# Patient Record
(demographics unavailable — no encounter records)

---

## 2017-05-21 NOTE — C.PDOC
History Of Present Illness





<Nancy Guillen - Last Filed: 17 13:16>





<lGo Kapoor - Last Filed: 17 13:55>


66-year-old female, PMHx includes ESRD, last HD , presents to the emergency 

department with complaints of respiratory distress. As per EMS, patient was 

found to be in respiratory distress w/ rales. Patient s/p CPAP, NG paste and NG 

sprays, now appears much better. Patient states she feels better. Son states 

"normal amount was removed" during last dialysis. Patient has intermittent 

shortness of breath, "only in the morning." Patient saw PMD for same. Started 

ZPAK x1 dose yesterday, and increased dose of Isosorbide. This morning w/ 

shortness of breath that was worse than before. No fever.








LIMITED DUE TO CLIN COND





AS PER EMS, PT FOUND RESP DISTRESS, +RALES. S/P CPAP, NG PASTE AND NG SPRAYS 

NOW APPEARS MUCH BETTER. PS FEELS BETTER. HO ESRD, LAST HD , SON STATES 

"NORMAL AMOUNT WAS REMOVED". HAS BEEN W INTERMIT SOB "ONLY IN MORNING", SAW PMD 

FOR SAME. STARTED ZPAK X 1 DOSE YEST, INCR DOSE ISOSORBIDE. THIS MORNING W 

WORSE THAN BEFORE SOB. NO FEVER.





ROS LIMITED





EXAM


MOD DIST NONTOXIC BIPAP IN PROGRESS


LUNGS B/L RALES W TACHYPNEA, UNABLE TO SPEAK DUE OT SOB.


CV RRR


WARM DRY


NEURO AO3, APPROPRIATE, INTERACTIVE. (Nancy Guillen)


History Per: EMS, Family


History/Exam Limitations: clinical condition


Current Symptoms Are (Timing): Still Present





<Nancy Guillen - Last Filed: 17 13:16>





<Glo Kapoor - Last Filed: 17 13:55>


Chief Complaint (Nursing): Respiratory Distress





Past Medical History


Reviewed: Historical Data, Nursing Documentation, Vital Signs





- Medical History


PMH: HTN, Hypercholesterolemia, End Stage Renal Disease


Family History: States: Unknown Family Hx





- Social History


Hx Tobacco Use: No


Hx Alcohol Use: No


Hx Substance Use: No





- Immunization History


Hx Tetanus Toxoid Vaccination: Yes


Hx Influenza Vaccination: Yes


Hx Pneumococcal Vaccination: Yes





<Nancy Guillen - Last Filed: 17 13:16>





Review Of Systems


Review Of Systems: ROS cannot be obtained secondary to pt's inabilty to answer 

questions. (respiratory distress)





<Nancy Guillen - Last Filed: 17 13:16>





Physical Exam





- Physical Exam


Appears: Non-toxic, Other (MOD DIST:  BIPAP IN PROGRESS)


Skin: Warm, Dry, No Rash


Head: Atraumatic, Normacephalic


Eye(s): bilateral: Normal Inspection


Nose: Normal


Oral Mucosa: Moist


Lips: Normal Appearing


Neck: Normal ROM


Cardiovascular: Rhythm Regular, No Murmur


Respiratory: Rales ((+) tachypnea), Other (unable to speak due to shortness of 

breath)


Extremity: Normal ROM


Neurological/Psych: Oriented x3 (appropriate, interactive)





<Nancy Guillen - Last Filed: 17 13:16>





ED Course And Treatment





- Laboratory Results


Result Diagrams: 


 17 11:10





 17 11:10


ECG: Interpreted By Me


ECG Rhythm: Sinus Rhythm


ECG Interpretation: Normal, No Acute Changes


Rate From EC


O2 Sat by Pulse Oximetry: 100


Pulse Ox Interpretation: Normal





- Radiology


CXR: Interpreted by Me


CXR Interpretation: Yes: Other (CHF)





<Nancy Guillen - Last Filed: 17 13:16>





- Laboratory Results


Result Diagrams: 


 17 11:10





 17 11:10





<Glo Kapoor - Last Filed: 17 13:55>





Progress





- Data Reviewed


Data Reviewed: Lab, Diagnostic imaging, EKG, Old records





- Critical Care


Citical Care: Excluding Proc Time


Critical Care Time: 120 minutes





- Continuity of Care


Discussed patient case with:: Patient, Family-HIPPA compliant, PMD





<aNncy Guillen - Last Filed: 17 13:16>





<Glo Kapoor - Last Filed: 17 13:55>





- Re-Evaluation


Re-evaluation Note: 





17 12:26


PT MUCH IMPROVED. TOLERATING OFF TRIDAL DRIP





D/W DR ROMEL CANO , WILL ARRANGE FOR EMERGENT HD (Nancy Guillen)





Disposition


Counseled Patient/Family Regarding: Studies Performed, Diagnosis





- Disposition


Disposition Time: 12:28





- POA


Present On Arrival: None





<Nancy Guillen - Last Filed: 17 13:16>





<Glo Kapoor - Last Filed: 17 13:55>





- Disposition


Disposition: HOSPITALIZED


Condition: STABLE





- Clinical Impression


Clinical Impression: 


 CHF (congestive heart failure), ESRD (end stage renal disease) on dialysis, 

Respiratory distress








- Scribe Statement


The provider has reviewed the documentation as recorded by the Scribe





<Nancy Guillen - Last Filed: 17 13:16>





<Glo Kapoor - Last Filed: 17 13:55>





- Scribe Statement





Genna Brown





All medical record entries made by the Scribe were at my direction and 

personally dictated by me. I have reviewed the chart and agree that the record 

accurately reflects my personal performance of the history, physical exam, 

medical decision making, and the department course for this patient. I have 

also personally directed, reviewed, and agree with the discharge instructions 

and disposition. (Nancy Guillen)





Decision To Admit





- Pt Status Changed To:


Hospital Disposition Of: Observation





- .


Bed Request Type: Telemetry


Admitting Physician: Rosina Cano





<Nancy Guillen - Last Filed: 17 13:16>





<Glo Kapoor - Last Filed: 17 13:55>





- .


Patient Diagnosis: 


 CHF (congestive heart failure), ESRD (end stage renal disease) on dialysis, 

Respiratory distress








Addendum





<Nancy Guillen - Last Filed: 17 13:16>





<Glo Kapoor - Last Filed: 17 13:55>


Addendum: 





17 13:54


CXR was placed to PA review folder.











Accession No. : [id number]


Patient Name / ID : LATISHA KAUFFMAN  / [id number]


Exam Date : 2017 11:05:26 ( Approved )


Study Comment : 


Sex / Age : F  / 066Y





Creator : Geoffrey Cates MD


Dictator : Geoffrey Cates MD


 : 


Approver : Geoffrey Cates MD


Approver2 : 





Report Date : 2017 12:10:06


My Comment : 


********************************************************************************

***





PROCEDURE:  CHEST RADIOGRAPH, 1 VIEW





HISTORY:


Shortness of breath 





COMPARISON:


08/10/2016





FINDINGS:





LUNGS:


Moderate to severe venous congestion with prominent bibasilar airspace 

opacities and small to moderate loculated right pleural effusion.





PLEURA:


As above.





CARDIOVASCULAR:


Cardiomegaly.





OSSEOUS STRUCTURES:


No significant abnormalities.





VISUALIZED UPPER ABDOMEN:


Normal.





OTHER FINDINGS:


Question external tubing projecting over the distal left clavicle. 





IMPRESSION:


Moderate to severe venous congestion with prominent bibasilar airspace 

opacities and small to moderate loculated right pleural effusion.











Patient was admitted to the hospital. No actions required from ED at this point.

 (Glo Kapoor)

## 2017-05-21 NOTE — CP.PCM.HP
History of Present Illness





- History of Present Illness


History of Present Illness: 


66-year-old female patient with a past medical history of hypertension, 

hypercholesterolemia, end-stage renal disease, last HD on 5/19, who presented 

to the ED with complaint of respiratory status.  As per EMS, patient was found 

to be in respiratory distress with rales.  Patient S/P CPAP, NG placed and NG 

sprays, now appear much better.  Patient states she feels better.  Son states 

normal amount was removed during last dialysis.  Patient has intermittent 

shortness of breath, "only in the morning".  Patient saw PMD for same, started 

ZPAK x1 dose yesterday, and increased dose of isosorbide.  This morning with 

shortness of breath that was worse than before.  Denies fever








Present on Admission





- Present on Admission


Any Indicators Present on Admission: No





Past Patient History





- Infectious Disease


Hx of Infectious Diseases: None





- Past Medical History & Family History


Past Medical History?: Yes





- Past Social History


Smoking Status: Never Smoked





- CARDIAC


Hx Hypercholesterolemia: Yes


Hx Hypertension: Yes





- RENAL


Type of Dialysis Access: left AV shunt


Date of Last Dialysis Treatment: 05/19/17


Hx Renal Failure: Yes (ESRD on HD)





- HEMATOLOGICAL/ONCOLOGICAL


Hx Blood Transfusions: Yes





- MUSCULOSKELETAL/RHEUMATOLOGICAL


Hx Falls: Yes (unstable)





- GENITOURINARY/GYNECOLOGICAL


Other/Comment: hd





- PSYCHIATRIC


Hx Substance Use: No





- SURGICAL HISTORY


Hx Surgeries: Yes


Hx Kidney Transplant: Yes


Other/Comment: KIDNEY TRANSPLANT.  LEFT ARM AV SHUNT





- ANESTHESIA


Hx Anesthesia: Yes


Hx Anesthesia Reactions: No


Hx Malignant Hyperthermia: No





Meds


Home Medications: 


 Home Medication List











 Medication  Instructions  Recorded  Confirmed  Type


 


Furosemide [Lasix] 80 mg PO DAILY #30 tab 05/23/17  Rx


 


hydrALAZINE [Apresoline] 25 mg PO Q8 #90 tab 05/23/17  Rx











Allergies/Adverse Reactions: 


 Allergies











Allergy/AdvReac Type Severity Reaction Status Date / Time


 


Penicillins Allergy Severe RASH Verified 08/10/16 01:11














Physical Exam





- Constitutional


Appears: Well





- Head Exam


Head Exam: ATRAUMATIC, NORMAL INSPECTION, NORMOCEPHALIC





- Eye Exam


Eye Exam: EOMI, Normal appearance, PERRL


Pupil Exam: NORMAL ACCOMODATION, PERRL





- ENT Exam


ENT Exam: Mucous Membranes Moist, Normal Exam





- Neck Exam


Neck exam: Positive for: Normal Inspection





- Respiratory Exam


Respiratory Exam: Decreased Breath Sounds





- Cardiovascular Exam


Cardiovascular Exam: REGULAR RHYTHM, +S1, +S2





- GI/Abdominal Exam


GI & Abdominal Exam: Diminished Bowel Sounds, Soft





- Rectal Exam


Rectal Exam: Deferred





Results





- Vital Signs


Recent Vital Signs: 





 Last Vital Signs











Temp  97.8 F   05/21/17 15:00


 


Pulse  64   05/21/17 17:04


 


Resp  17   05/21/17 15:00


 


BP  142/77   05/21/17 17:04


 


Pulse Ox  100   05/21/17 17:04














- Labs


Result Diagrams: 


 05/21/17 11:10





 05/21/17 11:10





Assessment & Plan


(1) CHF (congestive heart failure)


Status: Acute   


Comment: acute on chronic heartfailure   





(2) ESRD (end stage renal disease) on dialysis


Status: Acute   





(3) Respiratory distress


Status: Acute   





(4) Diabetes


Status: Acute   





(5) End stage renal disease


Status: Acute   





(6) HTN (hypertension)


Status: Acute   





(7) Nausea & vomiting


Status: Acute   





(8) Pneumonia


Status: Acute   





(9) Pulmonary edema


Status: Acute   





(10) Respiratory failure


Status: Acute   





- Assessment and Plan (Free Text)


Plan: 


Consult pulmonology


Chest x-ray shows moderate to severe venous congestion with prominent bibasilar 

airspace opacities


Norvasc


Coreg


Sensipar


Pepcid


Mucinex


Imdur

## 2017-05-21 NOTE — RAD
PROCEDURE:  CHEST RADIOGRAPH, 1 VIEW



HISTORY:

Shortness of breath 



COMPARISON:

08/10/2016



FINDINGS:



LUNGS:

Moderate to severe venous congestion with prominent bibasilar 

airspace opacities and small to moderate loculated right pleural 

effusion.



PLEURA:

As above.



CARDIOVASCULAR:

Cardiomegaly.



OSSEOUS STRUCTURES:

No significant abnormalities.



VISUALIZED UPPER ABDOMEN:

Normal.



OTHER FINDINGS:

Question external tubing projecting over the distal left clavicle. 



IMPRESSION:

Moderate to severe venous congestion with prominent bibasilar 

airspace opacities and small to moderate loculated right pleural 

effusion.

## 2017-05-22 NOTE — CON
DATE: 05/22/2017



REQUESTING PHYSICIAN:  Dr. GAEL Cano.  



HISTORY OF PRESENT ILLNESS:  This is a 66-year-old [place] female with history of end-stage renal dis
ease, on hemodialysis 3 times a week, was found to be in respiratory distress, came to the Emergency 
Room, where she was found to be in pulmonary edema.  The patient was subsequently admitted.  Denies a
ny chest pains.  The patient has a longstanding history of hypertension and chronic kidney disease.  
About more than 15 years ago, she had a renal transplant done which lasted for about 10 years and sub
sequently had failed.  For past 5-7 years, she is on hemodialysis.



MEDICATIONS AT HOME:  Include Coreg 3.125 twice a day, Imdur, Norvasc 10 mg and Sensipar.



PERSONAL HISTORY:  Does not smoke, does not drink.



ALLERGIES:  PENICILLIN.



FAMILY HISTORY:  Positive for hypertension.



PAST MEDICAL HISTORY:  History of renal transplant.



REVIEW OF SYSTEMS:  Generalized weakness, is essentially home-ridden, but walks around with a lot of 
support.  No fever.  No chills.  Nonproductive cough, wheezing.  Poor vision in both eyes.  No hemopt
ysis.  Shortness of breath at rest.  No chest pains.  Sleeps on 2-3 pillows.  Occasional ankle edema.
  No abdominal pain.  No hematemesis.  No melena.  Joint pains including hip and knees.  No history o
f TIAs or CVAs.  No history of depression.  No hematuria.



PHYSICAL EXAMINATION:

GENERAL:  Shows a middle-aged female, looking older than her stated age, in mild respiratory distress
.

VITAL SIGNS:  Her blood pressure is 150/78, heart rate of 66 and regular, respiratory rate of 24, tem
perature of 98.6, O2 sat is 97% on 2 L by nasal cannula.

HEAD:  Normocephalic.

EYES:  No pallor, no icterus.

MOUTH:  Partial upper and lower dentures.

NECK:  Supple.  _____ thyroid enlargement is noted.  No carotid bruits.

LUNGS:  Bibasilar rales and diffuse inspiratory and expiratory wheeze.

HEART:  Sounds are distant, but no definite gallops or murmurs.

ABDOMEN:  Soft, nontender.  Healed surgical scar of previous renal transplant is noted.

Abdomen is soft otherwise.

EXTREMITIES:  No cyanosis, clubbing or edema.  Distal pulses are intact.

NEUROLOGIC:  Awake, alert, oriented x 3.

PSYCHIATRIC:  Appears mildly depressed.



LABORATORY DATA:  BUN is 37, creatinine is 6.2.  CBC is acceptable.  



Chest x-ray shows congestive heart failure.  



EKG sinus rhythm, otherwise unremarkable.



ASSESSMENT:  This is a 66-year-old female with history of chronic kidney disease, status post renal t
ransplant and hypertension, who has presented with congestive heart failure.



PLAN:  Continue with aggressive hemodialysis.  We will repeat echocardiogram with Doppler studies to 
evaluate for any occult valvular heart disease and check LV size and function.  Care of plan was expl
ained to the patient.



I thank you.  We will follow as needed.





__________________________________________

Bogdan Pace MD







cc:



DD: 05/22/2017 12:59:52  [id number]

TT: 05/22/2017 13:54:28

Confirmation # [id number]

Dictation # [id number]

leona

## 2017-05-22 NOTE — CP.PCM.CON
Past Patient History





- Infectious Disease


Hx of Infectious Diseases: None





- Past Medical History & Family History


Past Medical History?: Yes





- Past Social History


Smoking Status: Never Smoked





- CARDIAC


Hx Cardiac Disorders: Yes


Hx Hypercholesterolemia: Yes


Hx Hypertension: Yes





- PULMONARY


Hx Respiratory Disorders: No





- NEUROLOGICAL


Hx Neurological Disorder: No





- HEENT


Hx HEENT Problems: No





- RENAL


Type of Dialysis Access: left AV shunt


Date of Last Dialysis Treatment: 05/19/17


Hx Renal Failure: Yes (ESRD on HD)





- ENDOCRINE/METABOLIC


Hx Endocrine Disorders: No





- HEMATOLOGICAL/ONCOLOGICAL


Hx Blood Transfusions: Yes





- INTEGUMENTARY


Hx Dermatological Problems: No





- MUSCULOSKELETAL/RHEUMATOLOGICAL


Hx Musculoskeletal Disorders: Yes


Hx Falls: Yes (unstable)





- GASTROINTESTINAL


Hx Gastrointestinal Disorders: No





- GENITOURINARY/GYNECOLOGICAL


Hx Genitourinary Disorders: No


Other/Comment: hd





- PSYCHIATRIC


Hx Psychophysiologic Disorder: No


Hx Substance Use: No





- SURGICAL HISTORY


Hx Surgeries: Yes


Hx Kidney Transplant: Yes


Other/Comment: KIDNEY TRANSPLANT.  LEFT ARM AV SHUNT





- ANESTHESIA


Hx Anesthesia: Yes


Hx Anesthesia Reactions: No


Hx Malignant Hyperthermia: No





Meds


Allergies/Adverse Reactions: 


 Allergies











Allergy/AdvReac Type Severity Reaction Status Date / Time


 


Penicillins Allergy Severe RASH Verified 08/10/16 01:11














- Medications


Medications: 


 Current Medications





Amlodipine Besylate (Norvasc)  10 mg PO DAILY [facility]


   Last Admin: 05/22/17 09:18 Dose:  10 mg


Carvedilol (Coreg)  3.125 mg PO BID [facility]


   Last Admin: 05/22/17 09:18 Dose:  3.125 mg


Cinacalcet (Sensipar)  60 mg PO DAILY [facility]


   Last Admin: 05/22/17 09:18 Dose:  60 mg


Famotidine (Pepcid)  20 mg PO DAILY [facility]


   Last Admin: 05/22/17 09:18 Dose:  20 mg


Guaifenesin (Mucinex La)  600 mg PO Q6 [facility]


   Last Admin: 05/22/17 11:36 Dose:  600 mg


Isosorbide Mononitrate (Imdur)  120 mg PO DAILY [facility]


   Last Admin: 05/22/17 09:18 Dose:  120 mg


Sevelamer Carbonate (Renvela)  800 mg PO BID [facility]


   Last Admin: 05/22/17 09:18 Dose:  800 mg











Results





- Vital Signs


Recent Vital Signs: 


 Last Vital Signs











Temp  98 F   05/22/17 13:40


 


Pulse  68   05/22/17 13:40


 


Resp  18   05/22/17 13:40


 


BP  120/70   05/22/17 13:40


 


Pulse Ox  97   05/22/17 07:25














- Labs


Result Diagrams: 


 05/21/17 11:10





 05/21/17 11:10

## 2017-05-22 NOTE — CP.PCM.PN
Subjective





- Date & Time of Evaluation


Date of Evaluation: 05/22/17


Time of Evaluation: 12:00





- Subjective


Subjective: 


clinically same





Objective





- Vital Signs/Intake and Output


Vital Signs (last 24 hours): 


 











Temp Pulse Resp BP Pulse Ox


 


 98 F   62   96 H  123/65   96 


 


 05/22/17 13:45  05/22/17 16:45  05/22/17 16:45  05/22/17 16:45  05/22/17 13:45








Intake and Output: 


 











 05/22/17 05/22/17





 06:59 18:59


 


Intake Total 110 


 


Balance 110 














- Medications


Medications: 


 Current Medications





Amlodipine Besylate (Norvasc)  10 mg PO DAILY [facility]


   Last Admin: 05/22/17 09:18 Dose:  10 mg


Carvedilol (Coreg)  3.125 mg PO BID [facility]


   Last Admin: 05/22/17 09:18 Dose:  3.125 mg


Cinacalcet (Sensipar)  60 mg PO DAILY [facility]


   Last Admin: 05/22/17 09:18 Dose:  60 mg


Famotidine (Pepcid)  20 mg PO DAILY [facility]


   Last Admin: 05/22/17 09:18 Dose:  20 mg


Guaifenesin (Mucinex La)  600 mg PO Q6 [facility]


   Last Admin: 05/22/17 17:57 Dose:  600 mg


Isosorbide Mononitrate (Imdur)  120 mg PO DAILY [facility]


   Last Admin: 05/22/17 09:18 Dose:  120 mg


Sevelamer Carbonate (Renvela)  800 mg PO BID [facility]


   Last Admin: 05/22/17 17:57 Dose:  800 mg











- Constitutional


Appears: Well





- Head Exam


Head Exam: ATRAUMATIC, NORMAL INSPECTION, NORMOCEPHALIC





- Eye Exam


Eye Exam: EOMI, Normal appearance, PERRL


Pupil Exam: NORMAL ACCOMODATION, PERRL





- ENT Exam


ENT Exam: Mucous Membranes Moist, Normal Exam





- Neck Exam


Neck Exam: Full ROM, Normal Inspection.  absent: Lymphadenopathy





- Respiratory Exam


Respiratory Exam: Decreased Breath Sounds





- Cardiovascular Exam


Cardiovascular Exam: REGULAR RHYTHM, +S1, +S2





- GI/Abdominal Exam


GI & Abdominal Exam: Soft, Diminished Bowel Sounds





- Rectal Exam


Rectal Exam: Deferred





Assessment and Plan


(1) CHF (congestive heart failure)


Status: Acute   





(2) ESRD (end stage renal disease) on dialysis


Status: Acute   





(3) Respiratory distress


Status: Acute   





(4) Diabetes


Status: Acute   





(5) End stage renal disease


Status: Acute   





(6) HTN (hypertension)


Status: Acute   





(7) Nausea & vomiting


Status: Acute   





(8) Pneumonia


Status: Acute   





(9) Pulmonary edema


Status: Acute   





(10) Respiratory failure


Status: Acute   





- Assessment and Plan (Free Text)


Plan: 





Consult pulmonology


Chest x-ray shows moderate to severe venous congestion with prominent bibasilar 

airspace opacities


Norvasc


Coreg


Sensipar


Pepcid


Mucinex


Imdur

## 2017-05-23 NOTE — CP.PCM.PN
Subjective





- Date & Time of Evaluation


Date of Evaluation: 05/23/17


Time of Evaluation: 13:53





- Subjective


Subjective: 





65 Y/O FEMALE SEEN AND EXAMINED


PMHX ESRD, ON HD, 


ADMITTED FOR SOB


EVALUATED BY CARDIO- DR BRAVO


ECH0- NORMAL LV/RV FUNCTION


IMPROVED W/HD 


RX GIVEN AS PER DR GAEL CHAVEZ


SAFE FOR OUTPT F/U








Objective





- Vital Signs/Intake and Output


Vital Signs (last 24 hours): 


 











Temp Pulse Resp BP Pulse Ox


 


 98.2 F   61   20   125/69   100 


 


 05/23/17 08:06  05/23/17 08:06  05/23/17 08:06  05/23/17 08:06  05/23/17 08:06








Intake and Output: 


 











 05/23/17 05/23/17





 06:59 18:59


 


Intake Total 150 


 


Balance 150 














- Medications


Medications: 


 Current Medications





Albuterol/Ipratropium (Duoneb 3 Mg/0.5 Mg (3 Ml) Ud)  3 ml INH RQ6 [facility]


   Last Admin: 05/23/17 13:13 Dose:  3 ml


Amlodipine Besylate (Norvasc)  10 mg PO DAILY [facility]


   Last Admin: 05/23/17 09:26 Dose:  10 mg


Carvedilol (Coreg)  3.125 mg PO BID [facility]


   Last Admin: 05/23/17 09:25 Dose:  3.125 mg


Cinacalcet (Sensipar)  60 mg PO DAILY [facility]


   Last Admin: 05/23/17 09:25 Dose:  60 mg


Famotidine (Pepcid)  20 mg PO DAILY [facility]


   Last Admin: 05/23/17 09:25 Dose:  20 mg


Guaifenesin (Mucinex La)  600 mg PO Q6 [facility]


   Last Admin: 05/23/17 11:58 Dose:  600 mg


Isosorbide Mononitrate (Imdur)  120 mg PO DAILY [facility]


   Last Admin: 05/23/17 09:26 Dose:  120 mg


Sevelamer Carbonate (Renvela)  800 mg PO BID [facility]


   Last Admin: 05/23/17 09:25 Dose:  800 mg

## 2017-05-23 NOTE — CP.PCM.PN
Subjective





- Date & Time of Evaluation


Date of Evaluation: 05/23/17


Time of Evaluation: 12:00





- Subjective


Subjective: 


clinically same





Objective





- Vital Signs/Intake and Output


Vital Signs (last 24 hours): 


 











Temp Pulse Resp BP Pulse Ox


 


 98.2 F   61   20   125/69   100 


 


 05/23/17 08:06  05/23/17 08:06  05/23/17 08:06  05/23/17 08:06  05/23/17 08:06








Intake and Output: 


 











 05/23/17 05/23/17





 06:59 18:59


 


Intake Total 150 


 


Balance 150 














- Medications


Medications: 


 Current Medications





Albuterol/Ipratropium (Duoneb 3 Mg/0.5 Mg (3 Ml) Ud)  3 ml INH RQ6 [facility]


Amlodipine Besylate (Norvasc)  10 mg PO DAILY [facility]


   Last Admin: 05/23/17 09:26 Dose:  10 mg


Carvedilol (Coreg)  3.125 mg PO BID [facility]


   Last Admin: 05/23/17 09:25 Dose:  3.125 mg


Cinacalcet (Sensipar)  60 mg PO DAILY [facility]


   Last Admin: 05/23/17 09:25 Dose:  60 mg


Famotidine (Pepcid)  20 mg PO DAILY [facility]


   Last Admin: 05/23/17 09:25 Dose:  20 mg


Guaifenesin (Mucinex La)  600 mg PO Q6 [facility]


   Last Admin: 05/23/17 11:58 Dose:  600 mg


Isosorbide Mononitrate (Imdur)  120 mg PO DAILY [facility]


   Last Admin: 05/23/17 09:26 Dose:  120 mg


Sevelamer Carbonate (Renvela)  800 mg PO BID [facility]


   Last Admin: 05/23/17 09:25 Dose:  800 mg











- Constitutional


Appears: Well





- Head Exam


Head Exam: ATRAUMATIC, NORMAL INSPECTION, NORMOCEPHALIC





- Eye Exam


Eye Exam: EOMI, Normal appearance, PERRL


Pupil Exam: NORMAL ACCOMODATION, PERRL





- ENT Exam


ENT Exam: Mucous Membranes Moist, Normal Exam





- Neck Exam


Neck Exam: Full ROM, Normal Inspection.  absent: Lymphadenopathy





- Respiratory Exam


Respiratory Exam: Decreased Breath Sounds





- Cardiovascular Exam


Cardiovascular Exam: REGULAR RHYTHM, +S1, +S2





- GI/Abdominal Exam


GI & Abdominal Exam: Soft, Diminished Bowel Sounds





- Rectal Exam


Rectal Exam: Deferred





Assessment and Plan


(1) CHF (congestive heart failure)


Status: Acute   





(2) ESRD (end stage renal disease) on dialysis


Status: Acute   





(3) Respiratory distress


Status: Acute   





(4) Diabetes


Status: Acute   





(5) End stage renal disease


Status: Acute   





(6) HTN (hypertension)


Status: Acute   





(7) Nausea & vomiting


Status: Acute   





(8) Pneumonia


Status: Acute   





(9) Pulmonary edema


Status: Acute   





(10) Respiratory failure


Status: Acute   





- Assessment and Plan (Free Text)


Plan: 


S/P HD


Patient can be discharged today


Follow-up as outpatient

## 2017-05-23 NOTE — CP.PCM.PN
Subjective





- Date & Time of Evaluation


Date of Evaluation: 05/23/17


Time of Evaluation: 15:35





- Subjective


Subjective: 





Pt seen and examined


Used BiPAP at night


Currently wheezing


Reports mild dyspnea





Objective





- Vital Signs/Intake and Output


Vital Signs (last 24 hours): 


 











Temp Pulse Resp BP Pulse Ox


 


 98.2 F   61   20   125/69   100 


 


 05/23/17 08:06  05/23/17 08:06  05/23/17 08:06  05/23/17 08:06  05/23/17 08:06








Intake and Output: 


 











 05/23/17 05/23/17





 06:59 18:59


 


Intake Total 150 


 


Balance 150 














- Medications


Medications: 


 Current Medications





Albuterol/Ipratropium (Duoneb 3 Mg/0.5 Mg (3 Ml) Ud)  3 ml INH RQ6 [facility]


   Last Admin: 05/23/17 13:13 Dose:  3 ml


Amlodipine Besylate (Norvasc)  10 mg PO DAILY [facility]


   Last Admin: 05/23/17 09:26 Dose:  10 mg


Carvedilol (Coreg)  3.125 mg PO BID [facility]


   Last Admin: 05/23/17 09:25 Dose:  3.125 mg


Cinacalcet (Sensipar)  60 mg PO DAILY [facility]


   Last Admin: 05/23/17 09:25 Dose:  60 mg


Famotidine (Pepcid)  20 mg PO DAILY [facility]


   Last Admin: 05/23/17 09:25 Dose:  20 mg


Guaifenesin (Mucinex La)  600 mg PO Q6 [facility]


   Last Admin: 05/23/17 11:58 Dose:  600 mg


Isosorbide Mononitrate (Imdur)  120 mg PO DAILY [facility]


   Last Admin: 05/23/17 09:26 Dose:  120 mg


Sevelamer Carbonate (Renvela)  800 mg PO BID [facility]


   Last Admin: 05/23/17 09:25 Dose:  800 mg











- Head Exam


Head Exam: NORMAL INSPECTION





- Eye Exam


Eye Exam: Normal appearance





- ENT Exam


ENT Exam: Mucous Membranes Moist





- Respiratory Exam


Respiratory Exam: Clear to Ausculation Bilateral, Prolonged Expiratory Phase, 

Wheezes





- Cardiovascular Exam


Cardiovascular Exam: REGULAR RHYTHM





- GI/Abdominal Exam


GI & Abdominal Exam: Soft, Normal Bowel Sounds





- Extremities Exam


Extremities Exam: Normal Inspection





- Neurological Exam


Neurological Exam: Alert, Oriented x3





- Psychiatric Exam


Psychiatric exam: Normal Affect, Normal Mood





- Skin


Skin Exam: Normal Color





Assessment and Plan





- Assessment and Plan (Free Text)


Assessment: 





COPD Exacerbation


CHF


ESRD





Add IV Steroids


Bronchodilators


BiPAP at night


Add advair 250/50 1 P BID


O2


HD as per renal

## 2017-05-23 NOTE — CP.PCM.PN
Subjective





- Date & Time of Evaluation


Date of Evaluation: 05/23/17


Time of Evaluation: 12:03





- Subjective


Subjective: 





sob





Objective





- Vital Signs/Intake and Output


Vital Signs (last 24 hours): 


 











Temp Pulse Resp BP Pulse Ox


 


 98.2 F   61   20   125/69   100 


 


 05/23/17 08:06  05/23/17 08:06  05/23/17 08:06  05/23/17 08:06  05/23/17 08:06








Intake and Output: 


 











 05/23/17 05/23/17





 06:59 18:59


 


Intake Total 150 


 


Balance 150 














- Medications


Medications: 


 Current Medications





Amlodipine Besylate (Norvasc)  10 mg PO DAILY [facility]


   Last Admin: 05/23/17 09:26 Dose:  10 mg


Carvedilol (Coreg)  3.125 mg PO BID [facility]


   Last Admin: 05/23/17 09:25 Dose:  3.125 mg


Cinacalcet (Sensipar)  60 mg PO DAILY [facility]


   Last Admin: 05/23/17 09:25 Dose:  60 mg


Famotidine (Pepcid)  20 mg PO DAILY [facility]


   Last Admin: 05/23/17 09:25 Dose:  20 mg


Guaifenesin (Mucinex La)  600 mg PO Q6 [facility]


   Last Admin: 05/23/17 11:58 Dose:  600 mg


Isosorbide Mononitrate (Imdur)  120 mg PO DAILY [facility]


   Last Admin: 05/23/17 09:26 Dose:  120 mg


Sevelamer Carbonate (Renvela)  800 mg PO BID [facility]


   Last Admin: 05/23/17 09:25 Dose:  800 mg











- Constitutional


Appears: Chronically Ill





- Head Exam


Head Exam: NORMOCEPHALIC





- Neck Exam


Neck Exam: Normal Inspection





- Respiratory Exam


Respiratory Exam: Rhonchi, Wheezes





- Cardiovascular Exam


Cardiovascular Exam: REGULAR RHYTHM





- GI/Abdominal Exam


GI & Abdominal Exam: Soft





- Extremities Exam


Extremities Exam: absent: Pedal Edema





- Neurological Exam


Neurological Exam: Alert, Oriented x3





Assessment and Plan





- Assessment and Plan (Free Text)


Assessment: 





echo reviewed by me.


normal lv & rv systolic function.


severe pulmonary hypertension.pulmonary systolic pressures of more than 70 mm 

of hg.


consider pulmonary eval.

## 2017-05-23 NOTE — PCM.HF
Heart Failure Core Measure





- Heart Failure


Ejection Fraction: 40 % or Greater (LVEF 69%)


Left Ventricular Function to be assessed after discharge: Yes


ACE Inhibitor Prescribed: No


Contraindication/Reason for not providing: ESRD


Beta-Blocker Prescribed: Carvedilol


Angiotensin II Receptor Blocker Prescribed: No


Contraindication/Reason for not providing: ESRD


AnticoagulationTherapy for Atrial Fibrillation/Atrialflutter: No


Contraindication/Reason for not providing: NO AFIB


Aldosterone Antagonist Prescribed: No


Contraindication/Reason for not providing: NOT INDICATED 


Hydralazine Nitrate Prescribed: No


Contraindication/Reason for not providing: NOT INDICATED 


Implantable Cardioverter Defibrillator Therapy: No


Contraindication/Reason for not providing: NOT INDICATED 


Cardiac Resynchronization Therapy Prescribed: No


Contraindication/Reason for not providing: NOT INDICATED 





- Follow up


Will be discharged to: Home


Follow Up Date (must be within 7 days from discharge): 05/25/17


Follow Up Time: 09:00

## 2017-05-24 NOTE — CARD
--------------- APPROVED REPORT --------------





EXAM: Two-dimensional and M-mode echocardiogram with Doppler and 

color Doppler.



Other Information 

Quality : GoodRhythm : NSR



INDICATION

Congestive Heart Failure ESRD, RESP DIST



RISK FACTORS

Hypertension 

Hyperlipidemia

Diabetes



M-Mode DIMENSIONS 

RVDd2.69   (2.1-3.2cm)Left Atrium (MM)5.19   (2.5-4.0cm)

IVSd1.22   (0.7-1.1cm)Aortic Root2.69   (2.2-3.7cm)

LVDd5.75   (4.0-5.6cm)Aortic Cusp Exc.1.72   (1.5-2.0cm)

PWd1.18   (0.7-1.1cm)FS (%) 39   %

LVDs3.50   (2.0-3.8cm)LVEF (%)69   (>50%)



Mitral Valve

MV E Axwirfdq913.8cm/sMV A Blfzcxor172.5cm/sE/A ratio0.9



TDI

E/Lateral E'0.0E/Medial E'0.0



Tricuspid Valve

TR Peak [id number]/sTR Peak [id number]



 LEFT VENTRICLE 

The Left Ventricle is mildly dilated. 

There is mild to moderate concentric left ventricular hypertrophy.

The left ventricular function is normal.

The left ventricular ejection fraction is within the normal range.

The Ejection Fraction is >55%.

No regional wall motion abnormalities noted.

The left ventricular diastolic function is normal.

No left ventricle thrombus noted on this study.

There is no ventricular septal defect visualized.

There is no left ventricular aneurysm. 

There is no mass noted in the left ventricle.



 RIGHT VENTRICLE 

The right ventricle is normal size.

There is normal right ventricular wall thickness.

The right ventricular systolic function is normal.



 ATRIA 

The left atrium is severely dilated. 

The right atrium size is normal.

The interatrial septum is intact with no evidence for an atrial 

septal defect.



 AORTIC VALVE 

The aortic valve is normal in structure and function.

No aortic regurgitation is present. 

There is no aortic valvular stenosis. 

There is no aortic valvular vegetation.



 MITRAL VALVE 

The mitral valve is normal in structure and function.

There is no evidence of mitral valve prolapse.

There is no mitral valve stenosis. 

There is no mitral valve regurgitation noted.



 TRICUSPID VALVE 

The tricuspid valve is normal in structure and function.



There is mild to moderate tricuspid regurgitation.

Right ventricular systolic pressure is estimated at greater than 60 

mmHg. 

There is severe pulmonary hypertension.

There is no tricuspid valve prolapse or vegetation.

There is no tricuspid valve stenosis. 



 PULMONIC VALVE 

The pulmonary valve is normal in structure and function.

There is no pulmonic valvular regurgitation. 

There is no pulmonic valvular stenosis.



 GREAT VESSELS 

The aortic root is normal in size.

The ascending aorta is normal in size.

The pulmonary artery is normal.

The IVC is normal in size and collapses >50% with inspiration.



 PERICARDIAL EFFUSION 

The pericardium appears normal.

There is no pleural effusion.



<Conclusion>

The Left Ventricle is mildly dilated. 

There is mild to moderate concentric left ventricular hypertrophy.

The left ventricular function is normal.

The Ejection Fraction is >55%.

The left atrium is severely dilated. 

Right ventricular systolic pressure is estimated at greater than 60 

mmHg. 

There is severe pulmonary hypertension.

## 2018-06-16 NOTE — C.PDOC
History Of Present Illness


Pt had hemodialysis today. After she got home from dialysis she started 

bleeding from left arm AV fistula site. Pt arrived to ED with inflated blood 

pressure cuff on left arm placed by EMS. 


Time Seen by Provider: 06/16/18 16:16


Chief Complaint (Nursing): Medical Clearance


History Per: Patient, EMS, Family


Onset/Duration Of Symptoms: Hrs (1)


Current Symptoms Are (Timing): Still Present


Severity: Moderate


Additional History Per: Prior Records





Past Medical History


Reviewed: Historical Data, Nursing Documentation, Vital Signs


Vital Signs: 





 Last Vital Signs











Temp      


 


Pulse  65   06/16/18 16:03


 


Resp  14   06/16/18 16:03


 


BP  170/97 H  06/16/18 16:03


 


Pulse Ox  93 L  06/16/18 16:03














- Medical History


PMH: HTN, Hypercholesterolemia, End Stage Renal Disease (on hemodialysis)





- [facility] Procedures











ASSISTANCE WITH RESPIRATORY VENTILATION, <24 HRS, CPAP (03/21/16)


PERFORMANCE OF URINARY FILTRATION, SINGLE (03/21/16)








Family History: States: Unknown Family Hx





- Social History


Hx Tobacco Use: No


Hx Alcohol Use: No


Hx Substance Use: No





- Immunization History


Hx Tetanus Toxoid Vaccination: Yes


Hx Influenza Vaccination: Yes


Hx Pneumococcal Vaccination: Yes





Review Of Systems


Except As Marked, All Systems Reviewed And Found Negative.


Constitutional: Negative for: Fever


Cardiovascular: Negative for: Chest Pain, Light Headedness


Respiratory: Negative for: Shortness of Breath


Gastrointestinal: Negative for: Vomiting, Abdominal Pain


Neurological: Negative for: Weakness





Physical Exam





- Physical Exam


Appears: Chronically Ill


Skin: Warm, Dry


Head: Atraumatic, Normacephalic


Eye(s): bilateral: PERRL, EOMI


Cardiovascular: Rhythm Regular


Respiratory: Normal Breath Sounds, No Accessory Muscle Use


Gastrointestinal/Abdominal: Soft, No Tenderness


Extremity: Normal ROM, Other (Left has dialysis fistula with good thrill. There 

is dry blood, but no active bleeding present now.)


Neurological/Psych: Oriented x3, Normal Motor, Normal Sensation





ED Course And Treatment





- Laboratory Results


Result Diagrams: 


 06/16/18 16:37





 06/16/18 16:37


Lab Interpretation: No Changes Compared To Prior Results


O2 Sat by Pulse Oximetry: 95


Pulse Ox Interpretation: Normal


Progress Note: Pressure dressing was applied to left arm. No bleeding during ED 

visit.


Reassessment Condition: Improved





Disposition


Counseled Patient/Family Regarding: Studies Performed, Diagnosis, Need For 

Followup





- Disposition


Referrals: 


Rosina Cano MD [Staff Provider] - 


Disposition: HOME/ ROUTINE


Disposition Time: 17:31


Condition: IMPROVED


Additional Instructions: 


Follow up with your doctor. Return to the ER if you develop bleeding, pain, 

weakness, numbness, worsening of symptoms or if you have any other concerns. 


Instructions:  Arteriovenous Fistula for Dialysis (DC)


Forms:  [facility] (English), General Discharge Instructions





- Clinical Impression


Clinical Impression: 


 Bleeding from dialysis shunt